# Patient Record
Sex: FEMALE | URBAN - METROPOLITAN AREA
[De-identification: names, ages, dates, MRNs, and addresses within clinical notes are randomized per-mention and may not be internally consistent; named-entity substitution may affect disease eponyms.]

---

## 2021-11-16 ENCOUNTER — APPOINTMENT (RX ONLY)
Dept: URBAN - METROPOLITAN AREA CLINIC 158 | Facility: CLINIC | Age: 56
Setting detail: DERMATOLOGY
End: 2021-11-16

## 2021-11-16 DIAGNOSIS — L30.9 DERMATITIS, UNSPECIFIED: ICD-10-CM

## 2021-11-16 PROCEDURE — ? OTHER

## 2021-11-16 PROCEDURE — ? PRESCRIPTION

## 2021-11-16 PROCEDURE — 99202 OFFICE O/P NEW SF 15 MIN: CPT | Mod: 25

## 2021-11-16 PROCEDURE — 11104 PUNCH BX SKIN SINGLE LESION: CPT

## 2021-11-16 PROCEDURE — ? COUNSELING

## 2021-11-16 PROCEDURE — ? BIOPSY BY PUNCH METHOD

## 2021-11-16 RX ORDER — CLOBETASOL PROPIONATE 0.5 MG/G
1 CREAM TOPICAL BID
Qty: 60 | Refills: 2 | Status: ERX | COMMUNITY
Start: 2021-11-16

## 2021-11-16 RX ADMIN — CLOBETASOL PROPIONATE 1: 0.5 CREAM TOPICAL at 00:00

## 2021-11-16 ASSESSMENT — LOCATION DETAILED DESCRIPTION DERM: LOCATION DETAILED: RIGHT PROXIMAL DORSAL FOREARM

## 2021-11-16 ASSESSMENT — LOCATION ZONE DERM: LOCATION ZONE: ARM

## 2021-11-16 ASSESSMENT — LOCATION SIMPLE DESCRIPTION DERM: LOCATION SIMPLE: RIGHT FOREARM

## 2021-11-16 NOTE — PROCEDURE: OTHER
Detail Level: Zone
Render Risk Assessment In Note?: no
Note Text (......Xxx Chief Complaint.): This diagnosis correlates with the
Other (Free Text): Patient has applied a topical steroid (from [place]), which hasn’t helped rash. Rash began a few months ago. She denies any new stress, medications, smoking, recent illness or new skin care. Pt has family hx of psoriasis. Will biopsy today and follow up in 2 weeks. Pt will also start clobetasol and good moisturizers.

## 2021-12-01 ENCOUNTER — APPOINTMENT (RX ONLY)
Dept: URBAN - METROPOLITAN AREA CLINIC 158 | Facility: CLINIC | Age: 56
Setting detail: DERMATOLOGY
End: 2021-12-01

## 2021-12-01 DIAGNOSIS — L20.89 OTHER ATOPIC DERMATITIS: ICD-10-CM

## 2021-12-01 DIAGNOSIS — Z48.02 ENCOUNTER FOR REMOVAL OF SUTURES: ICD-10-CM

## 2021-12-01 PROBLEM — L20.84 INTRINSIC (ALLERGIC) ECZEMA: Status: ACTIVE | Noted: 2021-12-01

## 2021-12-01 PROCEDURE — ? COUNSELING

## 2021-12-01 PROCEDURE — ? OTHER

## 2021-12-01 PROCEDURE — ? SUTURE REMOVAL (GLOBAL PERIOD)

## 2021-12-01 PROCEDURE — 99213 OFFICE O/P EST LOW 20 MIN: CPT

## 2021-12-01 PROCEDURE — 99024 POSTOP FOLLOW-UP VISIT: CPT

## 2021-12-01 ASSESSMENT — LOCATION DETAILED DESCRIPTION DERM: LOCATION DETAILED: RIGHT PROXIMAL DORSAL FOREARM

## 2021-12-01 ASSESSMENT — LOCATION ZONE DERM: LOCATION ZONE: ARM

## 2021-12-01 ASSESSMENT — LOCATION SIMPLE DESCRIPTION DERM: LOCATION SIMPLE: RIGHT FOREARM

## 2021-12-01 NOTE — PROCEDURE: SUTURE REMOVAL (GLOBAL PERIOD)
Detail Level: Detailed
Add 04413 Cpt? (Important Note: In 2017 The Use Of 58601 Is Being Tracked By Cms To Determine Future Global Period Reimbursement For Global Periods): yes

## 2021-12-01 NOTE — PROCEDURE: OTHER
Detail Level: Zone
Render Risk Assessment In Note?: no
Note Text (......Xxx Chief Complaint.): This diagnosis correlates with the
Other (Free Text): TODAYS VISIT- path was reviewed at this visit. Patient is much improved using clobetasol and cream moisturizers daily. Recommended to continue clobetasol 2-3 times a week.\\n\\nLVN-Patient has applied a topical steroid (from [place]), which hasn’t helped rash. Rash began a few months ago. She denies any new stress, medications, smoking, recent illness or new skin care. Pt has family hx of psoriasis. Will biopsy today and follow up in 2 weeks. Pt will also start clobetasol and good moisturizers.

## 2022-01-19 ENCOUNTER — APPOINTMENT (RX ONLY)
Dept: URBAN - METROPOLITAN AREA CLINIC 158 | Facility: CLINIC | Age: 57
Setting detail: DERMATOLOGY
End: 2022-01-19

## 2022-01-19 DIAGNOSIS — L20.89 OTHER ATOPIC DERMATITIS: ICD-10-CM

## 2022-01-19 PROBLEM — L20.84 INTRINSIC (ALLERGIC) ECZEMA: Status: ACTIVE | Noted: 2022-01-19

## 2022-01-19 PROCEDURE — ? OTHER

## 2022-01-19 PROCEDURE — ? PRESCRIPTION

## 2022-01-19 PROCEDURE — 99213 OFFICE O/P EST LOW 20 MIN: CPT

## 2022-01-19 PROCEDURE — ? COUNSELING

## 2022-01-19 RX ORDER — CLOBETASOL PROPIONATE 0.5 MG/G
1 CREAM TOPICAL BID
Qty: 60 | Refills: 3 | Status: ERX

## 2022-01-19 RX ORDER — TACROLIMUS 1 MG/G
1 OINTMENT TOPICAL TWICE A DAY
Qty: 60 | Refills: 2 | Status: ERX | COMMUNITY
Start: 2022-01-19

## 2022-01-19 RX ADMIN — TACROLIMUS 1: 1 OINTMENT TOPICAL at 00:00

## 2022-01-19 ASSESSMENT — LOCATION DETAILED DESCRIPTION DERM: LOCATION DETAILED: RIGHT PROXIMAL DORSAL FOREARM

## 2022-01-19 ASSESSMENT — LOCATION SIMPLE DESCRIPTION DERM: LOCATION SIMPLE: RIGHT FOREARM

## 2022-01-19 ASSESSMENT — LOCATION ZONE DERM: LOCATION ZONE: ARM

## 2022-01-19 NOTE — PROCEDURE: OTHER
Detail Level: Zone
Render Risk Assessment In Note?: no
Note Text (......Xxx Chief Complaint.): This diagnosis correlates with the
Other (Free Text): Todays visit- Rash was previously controlled using Clobetasol 3x a week. She has been out of clobetasol for 2 weeks and had a death in the family/increased stress.  Recommended to use clobetasol BID x 2-3 times a week and protopic BID on off days.

## 2022-07-12 ENCOUNTER — APPOINTMENT (RX ONLY)
Dept: URBAN - METROPOLITAN AREA CLINIC 158 | Facility: CLINIC | Age: 57
Setting detail: DERMATOLOGY
End: 2022-07-12

## 2022-07-12 DIAGNOSIS — L30.1 DYSHIDROSIS [POMPHOLYX]: ICD-10-CM

## 2022-07-12 PROCEDURE — ? OTHER

## 2022-07-12 PROCEDURE — 99213 OFFICE O/P EST LOW 20 MIN: CPT

## 2022-07-12 PROCEDURE — ? COUNSELING

## 2022-07-12 ASSESSMENT — LOCATION SIMPLE DESCRIPTION DERM
LOCATION SIMPLE: LEFT FOREARM
LOCATION SIMPLE: LEFT INDEX FINGER
LOCATION SIMPLE: RIGHT INDEX FINGER

## 2022-07-12 ASSESSMENT — LOCATION DETAILED DESCRIPTION DERM
LOCATION DETAILED: LEFT PROXIMAL DORSAL INDEX FINGER
LOCATION DETAILED: RIGHT MID DORSAL INDEX FINGER
LOCATION DETAILED: LEFT DISTAL DORSAL FOREARM

## 2022-07-12 ASSESSMENT — LOCATION ZONE DERM
LOCATION ZONE: ARM
LOCATION ZONE: FINGER

## 2022-07-12 NOTE — PROCEDURE: OTHER
Other (Free Text): Use cream moisturizer daily. Continue with clobetasol cream twice a day for 2 weeks then 2-3 times a week. Recommended to decrease hand  use.
Detail Level: Zone
Note Text (......Xxx Chief Complaint.): This diagnosis correlates with the
Render Risk Assessment In Note?: no

## 2024-07-16 ENCOUNTER — APPOINTMENT (RX ONLY)
Dept: URBAN - METROPOLITAN AREA CLINIC 158 | Facility: CLINIC | Age: 59
Setting detail: DERMATOLOGY
End: 2024-07-16

## 2024-07-16 DIAGNOSIS — L20.89 OTHER ATOPIC DERMATITIS: ICD-10-CM

## 2024-07-16 DIAGNOSIS — L259 CONTACT DERMATITIS AND OTHER ECZEMA, UNSPECIFIED CAUSE: ICD-10-CM

## 2024-07-16 PROBLEM — L30.8 OTHER SPECIFIED DERMATITIS: Status: ACTIVE | Noted: 2024-07-16

## 2024-07-16 PROBLEM — L30.9 DERMATITIS, UNSPECIFIED: Status: ACTIVE | Noted: 2024-07-16

## 2024-07-16 PROCEDURE — ? BIOPSY BY PUNCH METHOD

## 2024-07-16 PROCEDURE — 11104 PUNCH BX SKIN SINGLE LESION: CPT

## 2024-07-16 PROCEDURE — 99213 OFFICE O/P EST LOW 20 MIN: CPT | Mod: 25

## 2024-07-16 PROCEDURE — ? COUNSELING

## 2024-07-16 PROCEDURE — ? OTHER

## 2024-07-16 ASSESSMENT — LOCATION SIMPLE DESCRIPTION DERM
LOCATION SIMPLE: RIGHT HAND
LOCATION SIMPLE: RIGHT FOREARM

## 2024-07-16 ASSESSMENT — LOCATION ZONE DERM
LOCATION ZONE: ARM
LOCATION ZONE: HAND

## 2024-07-16 ASSESSMENT — LOCATION DETAILED DESCRIPTION DERM
LOCATION DETAILED: RIGHT PROXIMAL DORSAL FOREARM
LOCATION DETAILED: RIGHT ULNAR DORSAL HAND

## 2024-07-16 NOTE — PROCEDURE: OTHER
Detail Level: Zone
Render Risk Assessment In Note?: no
Note Text (......Xxx Chief Complaint.): This diagnosis correlates with the
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 Rash has not been controlled since she was last seen x2 years ago. She has used Clobetasol cream with no relief. Samples of Opzelura given to patient to try before prescribing. Discussed staring XTRAC laser 2-3 times a week. (Patients insurance is not covered through our office, will call patient with pricing. \\n\\nToday’s visit- Rash was previously controlled using Clobetasol 3x a week. She has been out of clobetasol for 2 weeks and had a death in the family/increased stress.  Recommended to use clobetasol BID x 2-3 times a week and protopic BID on off days.

## 2024-07-17 ENCOUNTER — RX ONLY (OUTPATIENT)
Age: 59
Setting detail: RX ONLY
End: 2024-07-17

## 2024-07-17 RX ORDER — FLUOCINONIDE 1 MG/G
1 CREAM TOPICAL BID
Qty: 60 | Refills: 2 | Status: ERX | COMMUNITY
Start: 2024-07-17

## 2024-07-30 ENCOUNTER — APPOINTMENT (RX ONLY)
Dept: URBAN - METROPOLITAN AREA CLINIC 158 | Facility: CLINIC | Age: 59
Setting detail: DERMATOLOGY
End: 2024-07-30

## 2024-07-30 DIAGNOSIS — L259 CONTACT DERMATITIS AND OTHER ECZEMA, UNSPECIFIED CAUSE: ICD-10-CM

## 2024-07-30 DIAGNOSIS — Z48.02 ENCOUNTER FOR REMOVAL OF SUTURES: ICD-10-CM

## 2024-07-30 PROBLEM — L30.8 OTHER SPECIFIED DERMATITIS: Status: ACTIVE | Noted: 2024-07-30

## 2024-07-30 PROCEDURE — ? PRESCRIPTION

## 2024-07-30 PROCEDURE — ? COUNSELING

## 2024-07-30 PROCEDURE — 99213 OFFICE O/P EST LOW 20 MIN: CPT

## 2024-07-30 PROCEDURE — ? OTHER

## 2024-07-30 PROCEDURE — ? SUTURE REMOVAL (GLOBAL PERIOD)

## 2024-07-30 RX ORDER — RUXOLITINIB 15 MG/G
1 CREAM TOPICAL TWICE DAILY
Qty: 60 | Refills: 5 | Status: ERX | COMMUNITY
Start: 2024-07-30

## 2024-07-30 RX ADMIN — RUXOLITINIB 1: 15 CREAM TOPICAL at 00:00

## 2024-07-30 ASSESSMENT — LOCATION ZONE DERM
LOCATION ZONE: ARM
LOCATION ZONE: HAND

## 2024-07-30 ASSESSMENT — LOCATION SIMPLE DESCRIPTION DERM
LOCATION SIMPLE: RIGHT FOREARM
LOCATION SIMPLE: RIGHT HAND

## 2024-07-30 ASSESSMENT — LOCATION DETAILED DESCRIPTION DERM
LOCATION DETAILED: RIGHT ULNAR DORSAL HAND
LOCATION DETAILED: RIGHT PROXIMAL DORSAL FOREARM

## 2024-07-30 NOTE — PROCEDURE: OTHER
Detail Level: Zone
Render Risk Assessment In Note?: no
Note Text (......Xxx Chief Complaint.): This diagnosis correlates with the
Other (Free Text): 7/30/24-Fluocinonide cream was irritating to rash. Opzelura samples were very effective. Will send rx/submit PA. \\n\\n07/16/2024- Previous bx proven spongitic derm. Rash has not been controlled since she was last seen x2 years ago. She has used Clobetasol cream with no relief. Samples of Opzelura given to patient to try before prescribing. Discussed staring XTRAC laser 2-3 times a week. (Patients insurance is not covered through our office, will call patient with pricing. \\n\\nToday’s visit- Rash was previously controlled using Clobetasol 3x a week. She has been out of clobetasol for 2 weeks and had a death in the family/increased stress.  Recommended to use clobetasol BID x 2-3 times a week and protopic BID on off days.

## 2024-07-30 NOTE — PROCEDURE: SUTURE REMOVAL (GLOBAL PERIOD)
Detail Level: Detailed
Add 93112 Cpt? (Important Note: In 2017 The Use Of 37487 Is Being Tracked By Cms To Determine Future Global Period Reimbursement For Global Periods): no

## 2024-08-07 ENCOUNTER — RX ONLY (OUTPATIENT)
Age: 59
Setting detail: RX ONLY
End: 2024-08-07

## 2024-08-07 RX ORDER — RUXOLITINIB 15 MG/G
1 CREAM TOPICAL BID
Qty: 60 | Refills: 11 | Status: ERX | COMMUNITY
Start: 2024-08-07

## 2024-08-07 RX ORDER — RUXOLITINIB 15 MG/G
1 CREAM TOPICAL TWICE DAILY
Qty: 60 | Refills: 5 | Status: ERX

## 2024-11-01 ENCOUNTER — RX ONLY (OUTPATIENT)
Age: 59
Setting detail: RX ONLY
End: 2024-11-01

## 2024-11-01 RX ORDER — RUXOLITINIB 15 MG/G
1 CREAM TOPICAL BID
Qty: 60 | Refills: 3 | Status: ERX

## 2024-12-03 ENCOUNTER — APPOINTMENT (RX ONLY)
Dept: URBAN - METROPOLITAN AREA CLINIC 158 | Facility: CLINIC | Age: 59
Setting detail: DERMATOLOGY
End: 2024-12-03

## 2024-12-03 DIAGNOSIS — L259 CONTACT DERMATITIS AND OTHER ECZEMA, UNSPECIFIED CAUSE: ICD-10-CM | Status: WELL CONTROLLED

## 2024-12-03 PROBLEM — L30.8 OTHER SPECIFIED DERMATITIS: Status: ACTIVE | Noted: 2024-12-03

## 2024-12-03 PROCEDURE — ? COUNSELING

## 2024-12-03 PROCEDURE — ? OTHER

## 2024-12-03 PROCEDURE — ? PRESCRIPTION

## 2024-12-03 PROCEDURE — 99213 OFFICE O/P EST LOW 20 MIN: CPT

## 2024-12-03 RX ORDER — RUXOLITINIB 15 MG/G
1 CREAM TOPICAL TWICE DAILY
Qty: 60 | Refills: 5 | Status: ERX

## 2024-12-03 ASSESSMENT — BSA RASH: BSA RASH: 6

## 2024-12-03 ASSESSMENT — LOCATION ZONE DERM: LOCATION ZONE: ARM

## 2024-12-03 ASSESSMENT — ITCH NUMERIC RATING SCALE: HOW SEVERE IS YOUR ITCHING?: 2

## 2024-12-03 ASSESSMENT — LOCATION SIMPLE DESCRIPTION DERM: LOCATION SIMPLE: RIGHT FOREARM

## 2024-12-03 ASSESSMENT — LOCATION DETAILED DESCRIPTION DERM: LOCATION DETAILED: RIGHT PROXIMAL DORSAL FOREARM

## 2024-12-03 NOTE — PROCEDURE: OTHER
Detail Level: Zone
Render Risk Assessment In Note?: no
Note Text (......Xxx Chief Complaint.): This diagnosis correlates with the
Other (Free Text): 7/30/24-Fluocinonide cream was irritating to rash. Opzelura samples were very effective. Will send rx/submit PA. \\n\\n07/16/2024- Previous bx proven spongitic derm. Rash has not been controlled since she was last seen x2 years ago. She has used Clobetasol cream with no relief. Samples of Opzelura given to patient to try before prescribing. Discussed staring XTRAC laser 2-3 times a week. (Patients insurance is not covered through our office, will call patient with pricing. \\n\\nToday’s visit- Rash was previously controlled using Clobetasol 3x a week. She has been out of clobetasol for 2 weeks and had a death in the family/increased stress.  Recommended to use clobetasol BID x 2-3 times a week and protopic BID on off days.\\n\\n12/3/24-well controlled using opzelura BID and Vaseline multiple times daily. Plan to continue. She has been out of Opzelura x 2 weeks, thick eczematous plaques on dorsal aspects of hands. Insurance requiring ov to continue rx. F/u in 2 months

## 2024-12-04 ENCOUNTER — RX ONLY (OUTPATIENT)
Age: 59
Setting detail: RX ONLY
End: 2024-12-04

## 2024-12-04 RX ORDER — RUXOLITINIB 15 MG/G
1 CREAM TOPICAL BID
Qty: 60 | Refills: 3 | Status: ERX

## 2025-02-07 ENCOUNTER — APPOINTMENT (OUTPATIENT)
Dept: URBAN - METROPOLITAN AREA CLINIC 158 | Facility: CLINIC | Age: 60
Setting detail: DERMATOLOGY
End: 2025-02-07

## 2025-02-07 DIAGNOSIS — L259 CONTACT DERMATITIS AND OTHER ECZEMA, UNSPECIFIED CAUSE: ICD-10-CM | Status: INADEQUATELY CONTROLLED

## 2025-02-07 PROBLEM — L30.8 OTHER SPECIFIED DERMATITIS: Status: ACTIVE | Noted: 2025-02-07

## 2025-02-07 PROCEDURE — ? COUNSELING

## 2025-02-07 PROCEDURE — ? OTHER

## 2025-02-07 PROCEDURE — 99213 OFFICE O/P EST LOW 20 MIN: CPT

## 2025-02-07 PROCEDURE — ? PRESCRIPTION

## 2025-02-07 RX ORDER — CLOBETASOL PROPIONATE 0.5 MG/G
1 CREAM TOPICAL TWICE DAILY
Qty: 60 | Refills: 2 | Status: ERX

## 2025-02-07 RX ORDER — RUXOLITINIB 15 MG/G
1 CREAM TOPICAL TWICE DAILY
Qty: 60 | Refills: 5 | Status: ERX

## 2025-02-07 ASSESSMENT — LOCATION SIMPLE DESCRIPTION DERM: LOCATION SIMPLE: RIGHT FOREARM

## 2025-02-07 ASSESSMENT — LOCATION ZONE DERM: LOCATION ZONE: ARM

## 2025-02-07 ASSESSMENT — LOCATION DETAILED DESCRIPTION DERM: LOCATION DETAILED: RIGHT PROXIMAL DORSAL FOREARM

## 2025-02-07 NOTE — PROCEDURE: OTHER
Detail Level: Zone
Render Risk Assessment In Note?: no
Note Text (......Xxx Chief Complaint.): This diagnosis correlates with the
Other (Free Text): 7/30/24-Fluocinonide cream was irritating to rash. Opzelura samples were very effective. Will send rx/submit PA. \\n\\n07/16/2024- Previous bx proven spongitic derm. Rash has not been controlled since she was last seen x2 years ago. She has used Clobetasol cream with no relief. Samples of Opzelura given to patient to try before prescribing. Discussed staring XTRAC laser 2-3 times a week. (Patients insurance is not covered through our office, will call patient with pricing. \\n\\nToday’s visit- Rash was previously controlled using Clobetasol 3x a week. She has been out of clobetasol for 2 weeks and had a death in the family/increased stress.  Recommended to use clobetasol BID x 2-3 times a week and protopic BID on off days.\\n\\n12/3/24-well controlled using opzelura BID and Vaseline multiple times daily. Plan to continue. She has been out of Opzelura x 2 weeks, thick eczematous plaques on dorsal aspects of hands. Insurance requiring ov to continue rx. F/u in 2 months\\n\\n02/07/25- Eczema has been flaring on hands. Discussed starting Clobetasol cream bid x2 weeks after two weeks of clobetasol will start alternating with Opzelura, continue daily moisturizer. Discussed starting XTRAC laser in 1 month if not improved at next OV.

## 2025-03-18 ENCOUNTER — APPOINTMENT (OUTPATIENT)
Dept: URBAN - METROPOLITAN AREA CLINIC 158 | Facility: CLINIC | Age: 60
Setting detail: DERMATOLOGY
End: 2025-03-18

## 2025-03-18 DIAGNOSIS — L259 CONTACT DERMATITIS AND OTHER ECZEMA, UNSPECIFIED CAUSE: ICD-10-CM | Status: INADEQUATELY CONTROLLED

## 2025-03-18 PROBLEM — L30.8 OTHER SPECIFIED DERMATITIS: Status: ACTIVE | Noted: 2025-03-18

## 2025-03-18 PROCEDURE — 99213 OFFICE O/P EST LOW 20 MIN: CPT

## 2025-03-18 PROCEDURE — ? OTHER

## 2025-03-18 PROCEDURE — ? COUNSELING

## 2025-03-18 PROCEDURE — ? PRESCRIPTION

## 2025-03-18 RX ORDER — PREDNISONE 10 MG/1
1 TABLET ORAL QD
Qty: 30 | Refills: 0 | Status: ERX | COMMUNITY
Start: 2025-03-18

## 2025-03-18 RX ADMIN — PREDNISONE 1: 10 TABLET ORAL at 00:00

## 2025-03-18 ASSESSMENT — LOCATION DETAILED DESCRIPTION DERM: LOCATION DETAILED: RIGHT PROXIMAL DORSAL FOREARM

## 2025-03-18 ASSESSMENT — LOCATION SIMPLE DESCRIPTION DERM: LOCATION SIMPLE: RIGHT FOREARM

## 2025-03-18 ASSESSMENT — LOCATION ZONE DERM: LOCATION ZONE: ARM

## 2025-03-18 NOTE — PROCEDURE: OTHER
Detail Level: Zone
Render Risk Assessment In Note?: no
Note Text (......Xxx Chief Complaint.): This diagnosis correlates with the
Other (Free Text): 03/18/25- Patient has been alt Opzelura/clobetasol and is worsening on hands and elbows bilateral.  She states she’s been noticing that Clobetasol causes xerosis. Will start prednisone course, d/c clobetasol and continue Opzelura and Vaseline. If not improving, will consider XTRAC and or Dupixent. Follow up in 3 months \\n\\n7/30/24-Fluocinonide cream was irritating to rash. Opzelura samples were very effective. Will send rx/submit PA. \\n\\n07/16/2024- Previous bx proven spongitic derm. Rash has not been controlled since she was last seen x2 years ago. She has used Clobetasol cream with no relief. Samples of Opzelura given to patient to try before prescribing. Discussed staring XTRAC laser 2-3 times a week. (Patients insurance is not covered through our office, will call patient with pricing. \\n\\nToday’s visit- Rash was previously controlled using Clobetasol 3x a week. She has been out of clobetasol for 2 weeks and had a death in the family/increased stress.  Recommended to use clobetasol BID x 2-3 times a week and protopic BID on off days.\\n\\n12/3/24-well controlled using opzelura BID and Vaseline multiple times daily. Plan to continue. She has been out of Opzelura x 2 weeks, thick eczematous plaques on dorsal aspects of hands. Insurance requiring ov to continue rx. F/u in 2 months\\n\\n02/07/25- Eczema has been flaring on hands. Discussed starting Clobetasol cream bid x2 weeks after two weeks of clobetasol will start alternating with Opzelura, continue daily moisturizer. Discussed starting XTRAC laser in 1 month if not improved at next OV.

## 2025-04-29 ENCOUNTER — APPOINTMENT (OUTPATIENT)
Dept: URBAN - METROPOLITAN AREA CLINIC 158 | Facility: CLINIC | Age: 60
Setting detail: DERMATOLOGY
End: 2025-04-29

## 2025-04-29 DIAGNOSIS — L259 CONTACT DERMATITIS AND OTHER ECZEMA, UNSPECIFIED CAUSE: ICD-10-CM | Status: INADEQUATELY CONTROLLED

## 2025-04-29 PROBLEM — L30.8 OTHER SPECIFIED DERMATITIS: Status: ACTIVE | Noted: 2025-04-29

## 2025-04-29 PROCEDURE — ? ORDER TESTS

## 2025-04-29 PROCEDURE — ? PRESCRIPTION

## 2025-04-29 PROCEDURE — ? OTHER

## 2025-04-29 PROCEDURE — 99213 OFFICE O/P EST LOW 20 MIN: CPT

## 2025-04-29 PROCEDURE — ? COUNSELING

## 2025-04-29 RX ORDER — DUPILUMAB 300 MG/2ML
1 INJECTION, SOLUTION SUBCUTANEOUS AS DIRECTED
Qty: 2 | Refills: 0 | Status: ERX

## 2025-04-29 RX ORDER — DUPILUMAB 300 MG/2ML
1 INJECTION, SOLUTION SUBCUTANEOUS AS DIRECTED
Qty: 2 | Refills: 11 | Status: ERX | COMMUNITY
Start: 2025-04-29

## 2025-04-29 RX ADMIN — DUPILUMAB 1: 300 INJECTION, SOLUTION SUBCUTANEOUS at 00:00

## 2025-04-29 ASSESSMENT — LOCATION SIMPLE DESCRIPTION DERM: LOCATION SIMPLE: RIGHT FOREARM

## 2025-04-29 ASSESSMENT — LOCATION DETAILED DESCRIPTION DERM: LOCATION DETAILED: RIGHT PROXIMAL DORSAL FOREARM

## 2025-04-29 ASSESSMENT — BSA RASH: BSA RASH: 5

## 2025-04-29 ASSESSMENT — LOCATION ZONE DERM: LOCATION ZONE: ARM

## 2025-04-29 ASSESSMENT — ITCH NUMERIC RATING SCALE: HOW SEVERE IS YOUR ITCHING?: 8

## 2025-04-29 NOTE — PROCEDURE: ORDER TESTS
Bill For Surgical Tray: no
Performing Laboratory: 0
Billing Type: Third-Party Bill
Expected Date Of Service: 04/29/2025

## 2025-04-29 NOTE — PROCEDURE: OTHER
Detail Level: Zone
Render Risk Assessment In Note?: no
Note Text (......Xxx Chief Complaint.): This diagnosis correlates with the
Other (Free Text): 04/29/25- Inadequately controlled on Opzelura Cleared on prednisone course, but flared 2 days later. Start Dupixent, discussed risks and benefits. Will order labs and start PA for Dupixent. \\n\\n03/18/25- Patient has been alt Opzelura/clobetasol and is worsening on hands and elbows bilateral.  She states she’s been noticing that Clobetasol causes xerosis. Will start prednisone course, d/c clobetasol and continue Opzelura and Vaseline. If not improving, will consider XTRAC and or Dupixent. Follow up in 3 months \\n\\n7/30/24-Fluocinonide cream was irritating to rash. Opzelura samples were very effective. Will send rx/submit PA. \\n\\n07/16/2024- Previous bx proven spongitic derm. Rash has not been controlled since she was last seen x2 years ago. She has used Clobetasol cream with no relief. Samples of Opzelura given to patient to try before prescribing. Discussed staring XTRAC laser 2-3 times a week. (Patients insurance is not covered through our office, will call patient with pricing. \\n\\nToday’s visit- Rash was previously controlled using Clobetasol 3x a week. She has been out of clobetasol for 2 weeks and had a death in the family/increased stress.  Recommended to use clobetasol BID x 2-3 times a week and protopic BID on off days.\\n\\n12/3/24-well controlled using opzelura BID and Vaseline multiple times daily. Plan to continue. She has been out of Opzelura x 2 weeks, thick eczematous plaques on dorsal aspects of hands. Insurance requiring ov to continue rx. F/u in 2 months\\n\\n02/07/25- Eczema has been flaring on hands. Discussed starting Clobetasol cream bid x2 weeks after two weeks of clobetasol will start alternating with Opzelura, continue daily moisturizer. Discussed starting XTRAC laser in 1 month if not improved at next OV.

## 2025-06-13 ENCOUNTER — APPOINTMENT (OUTPATIENT)
Dept: URBAN - METROPOLITAN AREA CLINIC 158 | Facility: CLINIC | Age: 60
Setting detail: DERMATOLOGY
End: 2025-06-13

## 2025-06-13 DIAGNOSIS — L259 CONTACT DERMATITIS AND OTHER ECZEMA, UNSPECIFIED CAUSE: ICD-10-CM | Status: INADEQUATELY CONTROLLED

## 2025-06-13 PROBLEM — L30.8 OTHER SPECIFIED DERMATITIS: Status: ACTIVE | Noted: 2025-06-13

## 2025-06-13 PROCEDURE — ? PRESCRIPTION

## 2025-06-13 PROCEDURE — ? OTHER

## 2025-06-13 PROCEDURE — ? COUNSELING

## 2025-06-13 RX ORDER — TRALOKINUMAB-LDRM 300 MG/2ML
1 INJECTION, SOLUTION SUBCUTANEOUS AS DIRECTED
Qty: 2 | Refills: 0 | Status: ERX | COMMUNITY
Start: 2025-06-13

## 2025-06-13 RX ORDER — CRISABOROLE 20 MG/G
1 OINTMENT TOPICAL BID
Qty: 60 | Refills: 2 | Status: ERX | COMMUNITY
Start: 2025-06-13

## 2025-06-13 RX ORDER — PREDNISONE 10 MG/1
1 TABLET ORAL AS DIRECTED
Qty: 30 | Refills: 0 | Status: ERX

## 2025-06-13 RX ORDER — TRALOKINUMAB-LDRM 300 MG/2ML
1 INJECTION, SOLUTION SUBCUTANEOUS AS DIRECTED
Qty: 2 | Refills: 11 | Status: ERX | COMMUNITY
Start: 2025-06-13

## 2025-06-13 RX ADMIN — CRISABOROLE 1: 20 OINTMENT TOPICAL at 00:00

## 2025-06-13 RX ADMIN — TRALOKINUMAB-LDRM 1: 300 INJECTION, SOLUTION SUBCUTANEOUS at 00:00

## 2025-06-13 ASSESSMENT — BSA RASH: BSA RASH: 10

## 2025-06-13 ASSESSMENT — LOCATION ZONE DERM
LOCATION ZONE: HAND
LOCATION ZONE: ARM

## 2025-06-13 ASSESSMENT — ITCH NUMERIC RATING SCALE: HOW SEVERE IS YOUR ITCHING?: 10

## 2025-06-13 ASSESSMENT — LOCATION SIMPLE DESCRIPTION DERM
LOCATION SIMPLE: RIGHT FOREARM
LOCATION SIMPLE: RIGHT HAND
LOCATION SIMPLE: LEFT HAND

## 2025-06-13 ASSESSMENT — LOCATION DETAILED DESCRIPTION DERM
LOCATION DETAILED: 3RD WEB SPACE RIGHT HAND
LOCATION DETAILED: RIGHT PROXIMAL DORSAL FOREARM
LOCATION DETAILED: 3RD WEB SPACE LEFT HAND

## 2025-06-13 NOTE — PROCEDURE: OTHER
Detail Level: Zone
Render Risk Assessment In Note?: no
Note Text (......Xxx Chief Complaint.): This diagnosis correlates with the
Other (Free Text): 06/13/25- Inadequately controlled on all topicals-currently using Opzelura. Dupixent has been denied, insurance requires trial of Eucrisa and Jovany prior to Dupixent.  Will start Eucrisa ointment daily, per PA & will prescribe Adbry. Will start prednisone course.  SCORAD 42. Follow up in 3 months \\n\\n04/29/25- Inadequately controlled on Opzelura Cleared on prednisone course, but flared 2 days later. Start Dupixent, discussed risks and benefits. Will order labs and start PA for Dupixent. \\n\\n03/18/25- Patient has been alt Opzelura/clobetasol and is worsening on hands and elbows bilateral.  She states she’s been noticing that Clobetasol causes xerosis. Will start prednisone course, d/c clobetasol and continue Opzelura and Vaseline. If not improving, will consider XTRAC and or Dupixent. Follow up in 3 months \\n\\n7/30/24-Fluocinonide cream was irritating to rash. Opzelura samples were very effective. Will send rx/submit PA. \\n\\n07/16/2024- Previous bx proven spongitic derm. Rash has not been controlled since she was last seen x2 years ago. She has used Clobetasol cream with no relief. Samples of Opzelura given to patient to try before prescribing. Discussed staring XTRAC laser 2-3 times a week. (Patients insurance is not covered through our office, will call patient with pricing. \\n\\nToday’s visit- Rash was previously controlled using Clobetasol 3x a week. She has been out of clobetasol for 2 weeks and had a death in the family/increased stress.  Recommended to use clobetasol BID x 2-3 times a week and protopic BID on off days.\\n\\n12/3/24-well controlled using opzelura BID and Vaseline multiple times daily. Plan to continue. She has been out of Opzelura x 2 weeks, thick eczematous plaques on dorsal aspects of hands. Insurance requiring ov to continue rx. F/u in 2 months\\n\\n02/07/25- Eczema has been flaring on hands. Discussed starting Clobetasol cream bid x2 weeks after two weeks of clobetasol will start alternating with Opzelura, continue daily moisturizer. Discussed starting XTRAC laser in 1 month if not improved at next OV.

## 2025-06-14 RX ORDER — CRISABOROLE 20 MG/G
1 OINTMENT TOPICAL BID
Qty: 60 | Refills: 2 | Status: ERX

## 2025-06-16 ENCOUNTER — RX ONLY (RX ONLY)
Age: 60
End: 2025-06-16

## 2025-06-16 RX ORDER — CRISABOROLE 20 MG/G
1 OINTMENT TOPICAL DAILY
Qty: 100 | Refills: 5 | Status: ERX